# Patient Record
Sex: FEMALE | Race: WHITE | Employment: FULL TIME
[De-identification: names, ages, dates, MRNs, and addresses within clinical notes are randomized per-mention and may not be internally consistent; named-entity substitution may affect disease eponyms.]

---

## 2022-05-25 ENCOUNTER — OFFICE VISIT (OUTPATIENT)
Dept: FAMILY MEDICINE CLINIC | Facility: CLINIC | Age: 32
End: 2022-05-25
Payer: COMMERCIAL

## 2022-05-25 VITALS
SYSTOLIC BLOOD PRESSURE: 120 MMHG | DIASTOLIC BLOOD PRESSURE: 82 MMHG | BODY MASS INDEX: 25.58 KG/M2 | HEIGHT: 62 IN | WEIGHT: 139 LBS

## 2022-05-25 DIAGNOSIS — F41.9 ANXIETY: Primary | ICD-10-CM

## 2022-05-25 PROCEDURE — 99214 OFFICE O/P EST MOD 30 MIN: CPT | Performed by: FAMILY MEDICINE

## 2022-05-25 RX ORDER — ESCITALOPRAM OXALATE 10 MG/1
10 TABLET ORAL DAILY
Qty: 30 TABLET | Refills: 3 | Status: SHIPPED | OUTPATIENT
Start: 2022-05-25 | End: 2022-06-22 | Stop reason: SDUPTHER

## 2022-05-25 RX ORDER — HYDROXYZINE HYDROCHLORIDE 25 MG/1
TABLET, FILM COATED ORAL
Qty: 30 TABLET | Refills: 0 | Status: SHIPPED | OUTPATIENT
Start: 2022-05-25 | End: 2022-07-12 | Stop reason: SDUPTHER

## 2022-05-25 ASSESSMENT — ENCOUNTER SYMPTOMS
CHEST TIGHTNESS: 0
FACIAL SWELLING: 0
APNEA: 0
SHORTNESS OF BREATH: 1
CHOKING: 0
COUGH: 0

## 2022-05-25 ASSESSMENT — ANXIETY QUESTIONNAIRES
4. TROUBLE RELAXING: 2
5. BEING SO RESTLESS THAT IT IS HARD TO SIT STILL: 0
6. BECOMING EASILY ANNOYED OR IRRITABLE: 2
3. WORRYING TOO MUCH ABOUT DIFFERENT THINGS: 2
1. FEELING NERVOUS, ANXIOUS, OR ON EDGE: 2
7. FEELING AFRAID AS IF SOMETHING AWFUL MIGHT HAPPEN: 2
2. NOT BEING ABLE TO STOP OR CONTROL WORRYING: 3
IF YOU CHECKED OFF ANY PROBLEMS ON THIS QUESTIONNAIRE, HOW DIFFICULT HAVE THESE PROBLEMS MADE IT FOR YOU TO DO YOUR WORK, TAKE CARE OF THINGS AT HOME, OR GET ALONG WITH OTHER PEOPLE: VERY DIFFICULT
GAD7 TOTAL SCORE: 13

## 2022-05-25 ASSESSMENT — PATIENT HEALTH QUESTIONNAIRE - PHQ9
SUM OF ALL RESPONSES TO PHQ QUESTIONS 1-9: 0
SUM OF ALL RESPONSES TO PHQ QUESTIONS 1-9: 0
2. FEELING DOWN, DEPRESSED OR HOPELESS: 0
SUM OF ALL RESPONSES TO PHQ QUESTIONS 1-9: 0
SUM OF ALL RESPONSES TO PHQ QUESTIONS 1-9: 0
SUM OF ALL RESPONSES TO PHQ9 QUESTIONS 1 & 2: 0
1. LITTLE INTEREST OR PLEASURE IN DOING THINGS: 0

## 2022-05-25 NOTE — PROGRESS NOTES
[address] [place] [address]  _______________________________________  Salome Gonzales MD                 [address] [place] [address],  [address]. [place], [address] [place] [address]                     Panda Lagunas 2                                                                                    Phone: [phone]                                                                                    Fax: [phone]    Longoria Officer is a 33 y.o. female who is seen for evaluation of   Chief Complaint   Patient presents with    Anxiety       HPI:   Anxiety with mild depression, mixed presentation, patient has had an increasing amount of anxiety in the last 1 or 2 years related to behavioral issues with her son. This situation is taking a new turn recently as he was removed from his 6th school in the last few years. She is now feeling feelings of wanting to avoid interaction with him and this makes her appropriately tearful and expressing regret and remorse. Patient needs assistance coping with these feelings as well as coping with intense behavioral challenges after childbearing she denies any thoughts of self-harm, denies any desire to harm the child at all. She states that she does not have much else to help him succeed and adjust with his challenges. Symptoms have been so bad with panic attack, tachycardia, shortness of breath, sweats for the last few days when she had to call out of work. Chief Complaint   Patient presents with    Anxiety         Review of Systems:    Review of Systems   Constitutional: Positive for diaphoresis and fatigue. Negative for activity change, appetite change, chills and unexpected weight change. HENT: Negative for congestion, dental problem, drooling, facial swelling, hearing loss and nosebleeds. Respiratory: Positive for shortness of breath. Negative for apnea, cough, choking and chest tightness. Cardiovascular: Positive for palpitations. Negative for chest pain and leg swelling. Genitourinary: Negative for difficulty urinating, dyspareunia, dysuria, enuresis, flank pain and frequency. Neurological: Negative for dizziness, seizures, facial asymmetry, numbness and headaches. Hematological: Negative for adenopathy. Psychiatric/Behavioral: Positive for behavioral problems and sleep disturbance. Negative for dysphoric mood, hallucinations, self-injury and suicidal ideas. The patient is nervous/anxious. The patient is not hyperactive. History:  Past Medical History:   Diagnosis Date    Attention deficit disorder without mention of hyperactivity        History reviewed. No pertinent surgical history. Family History   Problem Relation Age of Onset    No Known Problems Mother     Diabetes Father        Social History     Tobacco Use    Smoking status: Never Smoker    Smokeless tobacco: Never Used   Substance Use Topics    Alcohol use: Yes       No Known Allergies    Current Outpatient Medications   Medication Sig Dispense Refill    escitalopram (LEXAPRO) 10 MG tablet Take 1 tablet by mouth daily 30 tablet 3    hydrOXYzine (ATARAX) 25 MG tablet 1 po q day prn anxiety 30 tablet 0    lisdexamfetamine (VYVANSE) 30 MG capsule Take 30 mg by mouth daily. No current facility-administered medications for this visit. Vitals:    /82   Ht 5' 2\" (1.575 m)   Wt 139 lb (63 kg)   BMI 25.42 kg/m²     Physical Exam:  Physical Exam  Constitutional:       Appearance: Normal appearance. She is normal weight. She is not ill-appearing or diaphoretic. HENT:      Head: Normocephalic. Right Ear: Tympanic membrane normal.      Left Ear: Tympanic membrane normal.      Nose: No congestion or rhinorrhea. Cardiovascular:      Rate and Rhythm: Normal rate and regular rhythm. Pulses: Normal pulses. Heart sounds: Normal heart sounds. Pulmonary:      Effort: Pulmonary effort is normal.      Breath sounds: Normal breath sounds.    Musculoskeletal:         General: Normal range of motion. Cervical back: Normal range of motion and neck supple. Skin:     General: Skin is warm and dry. Neurological:      Mental Status: She is alert and oriented to person, place, and time. Psychiatric:      Comments: Patient with anxious/depressed mood, tearful at times, judgment appears normal, thought content appears well ordered. She is able to communicate her concerns appropriately. Assessment/Plan:     ICD-10-CM    1. Anxiety  F41.9 escitalopram (LEXAPRO) 10 MG tablet     hydrOXYzine (ATARAX) 25 MG tablet   38 minutes spent in this encounter in total, most this time face-to-face consultation and evaluation with the patient as well as chart review, the developing a plan, scheduling follow-up and postvisit documentation electronic medical record. At the end of the visit we concluded the patient is suffering from severe anxiety related to behavioral issues of her young child, he is in a special help with peds developmental and therapist.  We recommended [facility] counseling therapy for her and possibly family therapy for their family group. Also starting Lexapro 10 mg daily, risk and benefits of the medicine as well as side effect profile reviewed with her in detail. Patient understands how to take medicine antibiotic.   Recheck in 4 weeks call if there is other concerns prior to that    Chandrika Fonseca MD

## 2022-06-03 DIAGNOSIS — F98.8 ATTENTION DEFICIT DISORDER (ADD) WITHOUT HYPERACTIVITY: Primary | ICD-10-CM

## 2022-06-03 DIAGNOSIS — F41.9 ANXIETY: ICD-10-CM

## 2022-06-22 DIAGNOSIS — F41.9 ANXIETY: ICD-10-CM

## 2022-06-22 RX ORDER — ESCITALOPRAM OXALATE 10 MG/1
10 TABLET ORAL DAILY
Qty: 30 TABLET | Refills: 3 | Status: SHIPPED | OUTPATIENT
Start: 2022-06-22 | End: 2022-07-12 | Stop reason: SDUPTHER

## 2022-06-22 NOTE — TELEPHONE ENCOUNTER
----- Message from Kenji Agee sent at 6/22/2022  9:34 AM EDT -----  Subject: Refill Request    QUESTIONS  Name of Medication? escitalopram (LEXAPRO) 10 MG tablet  Patient-reported dosage and instructions? 10MG, Take 1 tablet by mouth   daily  How many days do you have left? 1  Preferred Pharmacy? [id number]  Pharmacy phone number (if available)? [phone]  ---------------------------------------------------------------------------  --------------  Rachna STOREY  What is the best way for the office to contact you? OK to leave message on   voicemail  Preferred Call Back Phone Number? [phone]  ---------------------------------------------------------------------------  --------------  SCRIPT ANSWERS  Relationship to Patient?  Self

## 2022-07-05 ENCOUNTER — TELEPHONE (OUTPATIENT)
Dept: FAMILY MEDICINE CLINIC | Facility: CLINIC | Age: 32
End: 2022-07-05

## 2022-07-05 DIAGNOSIS — F98.8 ATTENTION DEFICIT DISORDER (ADD) WITHOUT HYPERACTIVITY: ICD-10-CM

## 2022-07-05 NOTE — TELEPHONE ENCOUNTER
Ms. Danie Mendez called requesting a refill on her Vyvanse.  Needs it sent to the [facility] on [address]

## 2022-07-12 ENCOUNTER — OFFICE VISIT (OUTPATIENT)
Dept: FAMILY MEDICINE CLINIC | Facility: CLINIC | Age: 32
End: 2022-07-12
Payer: COMMERCIAL

## 2022-07-12 VITALS
HEIGHT: 62 IN | DIASTOLIC BLOOD PRESSURE: 70 MMHG | SYSTOLIC BLOOD PRESSURE: 132 MMHG | WEIGHT: 136 LBS | BODY MASS INDEX: 25.03 KG/M2

## 2022-07-12 DIAGNOSIS — F41.9 ANXIETY: ICD-10-CM

## 2022-07-12 DIAGNOSIS — F98.8 ATTENTION DEFICIT DISORDER (ADD) WITHOUT HYPERACTIVITY: ICD-10-CM

## 2022-07-12 PROCEDURE — 99214 OFFICE O/P EST MOD 30 MIN: CPT | Performed by: FAMILY MEDICINE

## 2022-07-12 RX ORDER — ESCITALOPRAM OXALATE 10 MG/1
10 TABLET ORAL DAILY
Qty: 30 TABLET | Refills: 3 | Status: SHIPPED | OUTPATIENT
Start: 2022-07-12

## 2022-07-12 RX ORDER — HYDROXYZINE HYDROCHLORIDE 25 MG/1
TABLET, FILM COATED ORAL
Qty: 30 TABLET | Refills: 0 | Status: SHIPPED | OUTPATIENT
Start: 2022-07-12

## 2022-07-12 ASSESSMENT — PATIENT HEALTH QUESTIONNAIRE - PHQ9
SUM OF ALL RESPONSES TO PHQ9 QUESTIONS 1 & 2: 0
1. LITTLE INTEREST OR PLEASURE IN DOING THINGS: 0
SUM OF ALL RESPONSES TO PHQ QUESTIONS 1-9: 0
2. FEELING DOWN, DEPRESSED OR HOPELESS: 0
SUM OF ALL RESPONSES TO PHQ QUESTIONS 1-9: 0

## 2022-07-12 ASSESSMENT — ENCOUNTER SYMPTOMS
ABDOMINAL PAIN: 0
RESPIRATORY NEGATIVE: 1
EYES NEGATIVE: 1

## 2022-07-12 NOTE — PROGRESS NOTES
[address] [place] [address]  _______________________________________  Sundeep Stubbs MD                 [address] [place] [address], [address]. [place]                     [facility]                                                                                    Phone: [phone]                                                                                    Fax: [phone]    Neftaly Greenfield is a 27 y.o. female who is seen for evaluation of   Chief Complaint   Patient presents with    Mental Health Problem    ADHD       HPI:   Mental Health Problem  Episode onset: anxiety controlled with meds, no side effect, see details below. The degree of incapacity that she is experiencing as a consequence of her illness is moderate. Additional symptoms of the illness do not include anhedonia, insomnia, hypersomnia, appetite change, fatigue, agitation, psychomotor retardation, feelings of worthlessness, attention impairment, euphoric mood, increased goal-directed activity, inflated self-esteem, decreased need for sleep, headaches or abdominal pain. Other  Episode frequency: ADD on meds, need refills,  check by staff, no problme snoted,  Pertinent negatives include no abdominal pain, chills, fatigue or headaches. Chief Complaint   Patient presents with   [address] Problem    ADHD         Review of Systems:    Review of Systems   Constitutional: Negative for activity change, appetite change, chills and fatigue. HENT: Negative. Eyes: Negative. Respiratory: Negative. Gastrointestinal: Negative for abdominal pain. Endocrine: Negative. Genitourinary: Negative. Neurological: Negative for headaches. Psychiatric/Behavioral: Negative for agitation, behavioral problems, confusion, decreased concentration, sleep disturbance and suicidal ideas. The patient is nervous/anxious. The patient does not have insomnia.         History:  Past Medical History:   Diagnosis Date    Attention deficit disorder without mention of hyperactivity        History reviewed. No pertinent surgical history. Family History   Problem Relation Age of Onset    No Known Problems Mother     Diabetes Father        Social History     Tobacco Use    Smoking status: Never Smoker    Smokeless tobacco: Never Used   Substance Use Topics    Alcohol use: Yes       No Known Allergies    Current Outpatient Medications   Medication Sig Dispense Refill    lisdexamfetamine (VYVANSE) 30 MG capsule Take 1 capsule by mouth daily for 30 days. 30 capsule 0    escitalopram (LEXAPRO) 10 MG tablet Take 1 tablet by mouth daily 30 tablet 3    hydrOXYzine HCl (ATARAX) 25 MG tablet 1 po q day prn anxiety 30 tablet 0     No current facility-administered medications for this visit. Vitals:    /70   Ht 5' 2\" (1.575 m)   Wt 136 lb (61.7 kg)   BMI 24.87 kg/m²     Physical Exam:  Physical Exam  Constitutional:       Appearance: Normal appearance. She is normal weight. She is not ill-appearing or diaphoretic. HENT:      Head: Normocephalic. Right Ear: Tympanic membrane normal.      Left Ear: Tympanic membrane normal.      Nose: No congestion or rhinorrhea. Cardiovascular:      Rate and Rhythm: Normal rate and regular rhythm. Pulses: Normal pulses. Heart sounds: Normal heart sounds. Pulmonary:      Effort: Pulmonary effort is normal.      Breath sounds: Normal breath sounds. Musculoskeletal:         General: Normal range of motion. Cervical back: Normal range of motion and neck supple. Skin:     General: Skin is warm and dry. Neurological:      Mental Status: She is alert and oriented to person, place, and time. Assessment/Plan:     ICD-10-CM    1. Attention deficit disorder (ADD) without hyperactivity  F98.8 lisdexamfetamine (VYVANSE) 30 MG capsule   2.  Anxiety  F41.9 escitalopram (LEXAPRO) 10 MG tablet     hydrOXYzine HCl (ATARAX) 25 MG tablet     Total 31 min in this encounter including chart review, face to face discussion and counseling with pt as well as post visit documentation in EMR. Pt responding well to lexapro, denies any side effect at present,   Anxiety much better,   Relationship with son is much improved, less stress and very few panic attacks, had good results with atarax prn only. Is weaning off all OTC sleep meds due to sleep is improved.        Mo Briggs MD

## 2022-08-01 DIAGNOSIS — F98.8 ATTENTION DEFICIT DISORDER (ADD) WITHOUT HYPERACTIVITY: ICD-10-CM

## 2022-08-01 NOTE — TELEPHONE ENCOUNTER
7/12/22 was last ADD f/u    I have reviewed the patients controlled substance prescription history, as maintained in the [place] prescription monitoring program, so that the prescription(s) for a  controlled substance can be given.     Last filled Rx per  was 07/5/22

## 2022-08-23 ENCOUNTER — TELEPHONE (OUTPATIENT)
Dept: FAMILY MEDICINE CLINIC | Facility: CLINIC | Age: 32
End: 2022-08-23

## 2022-08-31 ENCOUNTER — TELEPHONE (OUTPATIENT)
Dept: FAMILY MEDICINE CLINIC | Facility: CLINIC | Age: 32
End: 2022-08-31

## 2022-08-31 DIAGNOSIS — F98.8 ATTENTION DEFICIT DISORDER (ADD) WITHOUT HYPERACTIVITY: ICD-10-CM

## 2022-08-31 NOTE — TELEPHONE ENCOUNTER
Pt is calling asking for a refill on Vyvanse to be sent to [facility] on [address]

## 2022-10-03 DIAGNOSIS — F98.8 ATTENTION DEFICIT DISORDER (ADD) WITHOUT HYPERACTIVITY: ICD-10-CM

## 2022-11-14 ENCOUNTER — TELEMEDICINE (OUTPATIENT)
Dept: FAMILY MEDICINE CLINIC | Facility: CLINIC | Age: 32
End: 2022-11-14
Payer: COMMERCIAL

## 2022-11-14 DIAGNOSIS — F98.8 ATTENTION DEFICIT DISORDER (ADD) WITHOUT HYPERACTIVITY: Primary | ICD-10-CM

## 2022-11-14 DIAGNOSIS — F41.9 ANXIETY: ICD-10-CM

## 2022-11-14 PROCEDURE — 99213 OFFICE O/P EST LOW 20 MIN: CPT | Performed by: FAMILY MEDICINE

## 2022-11-14 RX ORDER — LISDEXAMFETAMINE DIMESYLATE 40 MG/1
1 CAPSULE ORAL DAILY
Qty: 30 CAPSULE | Refills: 0 | Status: SHIPPED | OUTPATIENT
Start: 2022-11-14 | End: 2022-12-14

## 2022-11-14 NOTE — PROGRESS NOTES
[address] [place] [address]  _______________________________________  Ally Clayton MD                 [address] [place] [address],  [address]. [place], [address] [place] [address]                     Panda [address]                                                                                    Phone: [phone]                                                                                    Fax: [phone]    Subjective:  Juana reports better focus with work and other responsibilities when using the medication regularly. Juana denies chest pain, shortness of breath, palpitations, syncope, headache or appetite problems while on the medication. Vyvanse only lasting until 12:30 or 1 pm, wants to try higher dose for a while. Has also tried her son's short acting ritalin as a boost in early/mid afternoon at times  Anxiety better with lexapro, no sx of depression breaking through at this point. Allergies:  No Known Allergies    Medications:  Current Outpatient Medications   Medication Sig Dispense Refill    Lisdexamfetamine Dimesylate (VYVANSE) 40 MG CAPS Take 1 capsule by mouth daily for 30 days. 30 capsule 0    escitalopram (LEXAPRO) 10 MG tablet Take 1 tablet by mouth daily 30 tablet 3    hydrOXYzine HCl (ATARAX) 25 MG tablet 1 po q day prn anxiety 30 tablet 0     No current facility-administered medications for this visit. Objective: There were no vitals taken for this visit. Head ears nose neck ext nromal   No resp distress noted  Neuro: Normal cranial nerves and cerebellar function. Normal sensory and motor exams. Normal cognition. No focal deficits noted. Assessment:    ICD-10-CM    1. Attention deficit disorder (ADD) without hyperactivity  F98.8 Lisdexamfetamine Dimesylate (VYVANSE) 40 MG CAPS      2. Anxiety  F41.9           Plan:  1. Continue meds with dose adjustment as discussed  2. Prescription given. 3. Call with any problems or side effects. 4. Recheck appointment in 4 months.   Patric Copeland Jaun, was evaluated through a synchronous (real-time) audio-video encounter. The patient (or guardian if applicable) is aware that this is a billable service, which includes applicable co-pays. This Virtual Visit was conducted with patient's (and/or legal guardian's) consent. The visit was conducted pursuant to the emergency declaration under the [facility], [facility] authority and the [facility] [employer] Act. Patient identification was verified, and a caregiver was present when appropriate. The patient was located at Home: [address] [facility]. Provider was located at [facility] ([address] [place] [address]): [address] [place] [address],  [facility]. Total time spent for this encounter: Not billed by time    --Snow Andres MD on 11/14/2022 at 2:54 PM    An electronic signature was used to authenticate this note.      Snow Andres MD

## 2022-12-08 ENCOUNTER — OFFICE VISIT (OUTPATIENT)
Dept: FAMILY MEDICINE CLINIC | Facility: CLINIC | Age: 32
End: 2022-12-08
Payer: COMMERCIAL

## 2022-12-08 VITALS
BODY MASS INDEX: 25.95 KG/M2 | DIASTOLIC BLOOD PRESSURE: 98 MMHG | HEIGHT: 62 IN | SYSTOLIC BLOOD PRESSURE: 128 MMHG | WEIGHT: 141 LBS

## 2022-12-08 DIAGNOSIS — N30.00 ACUTE CYSTITIS WITHOUT HEMATURIA: ICD-10-CM

## 2022-12-08 DIAGNOSIS — R35.0 URINE FREQUENCY: Primary | ICD-10-CM

## 2022-12-08 LAB
BILIRUBIN, URINE, POC: NEGATIVE
BLOOD URINE, POC: NEGATIVE
GLUCOSE URINE, POC: NEGATIVE
KETONES, URINE, POC: NEGATIVE
LEUKOCYTE ESTERASE, URINE, POC: NEGATIVE
NITRITE, URINE, POC: NEGATIVE
PH, URINE, POC: 8 (ref 4.6–8)
PROTEIN,URINE, POC: NEGATIVE
SPECIFIC GRAVITY, URINE, POC: 1.01 (ref 1–1.03)
URINALYSIS CLARITY, POC: CLEAR
URINALYSIS COLOR, POC: YELLOW
UROBILINOGEN, POC: NEGATIVE

## 2022-12-08 PROCEDURE — 99213 OFFICE O/P EST LOW 20 MIN: CPT | Performed by: FAMILY MEDICINE

## 2022-12-08 PROCEDURE — 81003 URINALYSIS AUTO W/O SCOPE: CPT | Performed by: FAMILY MEDICINE

## 2022-12-08 RX ORDER — NITROFURANTOIN 25; 75 MG/1; MG/1
100 CAPSULE ORAL 2 TIMES DAILY
Qty: 10 CAPSULE | Refills: 0 | Status: SHIPPED | OUTPATIENT
Start: 2022-12-08 | End: 2022-12-13

## 2022-12-08 ASSESSMENT — PATIENT HEALTH QUESTIONNAIRE - PHQ9
SUM OF ALL RESPONSES TO PHQ QUESTIONS 1-9: 0
2. FEELING DOWN, DEPRESSED OR HOPELESS: 0
SUM OF ALL RESPONSES TO PHQ QUESTIONS 1-9: 0
1. LITTLE INTEREST OR PLEASURE IN DOING THINGS: 0
SUM OF ALL RESPONSES TO PHQ9 QUESTIONS 1 & 2: 0

## 2022-12-08 NOTE — PROGRESS NOTES
[address] [place] [address]  _______________________________________  Krystal Solitario MD                 [address] [place] [address],  [address]. [place], [address] [place] [address]                 Panda Lagunas                                                                                     Phone: [phone]                                                                                    Fax: [phone]    Subjective:  Elbert Mckeon is a 27 y. o.year old female presenting with complaints of mild to moderate urinary frequency and urgency with mild lower abdominal pressure-type discomfort. Patient does not have had any fevers in the past few days. Allergies:  No Known Allergies    Medications:  Current Outpatient Medications   Medication Sig Dispense Refill    nitrofurantoin, macrocrystal-monohydrate, (MACROBID) 100 MG capsule Take 1 capsule by mouth 2 times daily for 5 days 10 capsule 0    Lisdexamfetamine Dimesylate (VYVANSE) 40 MG CAPS Take 1 capsule by mouth daily for 30 days. 30 capsule 0    escitalopram (LEXAPRO) 10 MG tablet Take 1 tablet by mouth daily 30 tablet 3    hydrOXYzine HCl (ATARAX) 25 MG tablet 1 po q day prn anxiety (Patient not taking: Reported on 12/8/2022) 30 tablet 0     No current facility-administered medications for this visit. Objective:  General:  Afebrile. Vitals are stable. Cardiovascular:  Regular rate and rhythm. Chest:  Clear to auscultation. Abdomen:  Minimal lower pain but no guarding or rebound noted. No costovertebral angle pain noted.     Labs:    Results for orders placed or performed in visit on 12/08/22   AMB POC URINALYSIS DIP STICK AUTO W/O MICRO   Result Value Ref Range    Color, Urine, POC yellow     Clarity, Urine, POC clear     Glucose, Urine, POC Negative Negative    Bilirubin, Urine, POC Negative Negative    Ketones, Urine, POC Negative Negative    Specific Gravity, Urine, POC 1.010 1.001 - 1.035    Blood, Urine, POC negative Negative    pH, Urine, POC 8.0 4.6 - 8.0    Protein, Urine, POC Negative Negative    Urobilinogen, POC negative     Nitrate, Urine, POC negative Negative    Leukocyte Esterase, Urine, POC Negative Negative       Assessment;    ICD-10-CM    1. Urine frequency  R35.0 AMB POC URINALYSIS DIP STICK AUTO W/O MICRO     Culture, Urine     Culture, Urine     nitrofurantoin, macrocrystal-monohydrate, (MACROBID) 100 MG capsule      2. Acute cystitis without hematuria  N30.00           Plan:  1. Meds sent. Culture pending  2. Increase fluids. 3.  Limited caffeine. 4.  Call if problems get worse or do not resolve. 5.  Recheck in 2 weeks with the nurse if blood is present on specimen.     Favio Vasquez MD

## 2022-12-11 LAB
BACTERIA SPEC CULT: NORMAL
SERVICE CMNT-IMP: NORMAL

## 2022-12-19 DIAGNOSIS — F98.8 ATTENTION DEFICIT DISORDER (ADD) WITHOUT HYPERACTIVITY: Primary | ICD-10-CM

## 2022-12-19 NOTE — TELEPHONE ENCOUNTER
11/14/22 was last f/u for ADD    I have reviewed the patients controlled substance prescription history, as maintained in the [place] prescription monitoring program, so that the prescription(s) for a  controlled substance can be given.     Last filled Rx per  was 11/14/22    She tried the 40 mg but would like to go back to the 30 mg

## 2022-12-24 ENCOUNTER — TELEPHONE (OUTPATIENT)
Dept: FAMILY MEDICINE CLINIC | Facility: CLINIC | Age: 32
End: 2022-12-24

## 2022-12-24 DIAGNOSIS — F41.9 ANXIETY: Primary | ICD-10-CM

## 2022-12-24 RX ORDER — ESCITALOPRAM OXALATE 10 MG/1
10 TABLET ORAL DAILY
Qty: 30 TABLET | Refills: 3 | Status: SHIPPED | OUTPATIENT
Start: 2022-12-24